# Patient Record
Sex: MALE | HISPANIC OR LATINO | Employment: FULL TIME | URBAN - METROPOLITAN AREA
[De-identification: names, ages, dates, MRNs, and addresses within clinical notes are randomized per-mention and may not be internally consistent; named-entity substitution may affect disease eponyms.]

---

## 2023-06-15 ENCOUNTER — OFFICE VISIT (OUTPATIENT)
Dept: FAMILY MEDICINE | Facility: CLINIC | Age: 18
End: 2023-06-15
Payer: COMMERCIAL

## 2023-06-15 VITALS
BODY MASS INDEX: 21.56 KG/M2 | WEIGHT: 145.6 LBS | HEIGHT: 69 IN | HEART RATE: 64 BPM | DIASTOLIC BLOOD PRESSURE: 68 MMHG | TEMPERATURE: 98.8 F | OXYGEN SATURATION: 98 % | SYSTOLIC BLOOD PRESSURE: 112 MMHG | RESPIRATION RATE: 16 BRPM

## 2023-06-15 DIAGNOSIS — J06.9 VIRAL URI: Primary | ICD-10-CM

## 2023-06-15 DIAGNOSIS — M54.6 BILATERAL THORACIC BACK PAIN, UNSPECIFIED CHRONICITY: ICD-10-CM

## 2023-06-15 PROCEDURE — 99204 OFFICE O/P NEW MOD 45 MIN: CPT | Performed by: PHYSICIAN ASSISTANT

## 2023-06-15 RX ORDER — BENZONATATE 100 MG/1
100 CAPSULE ORAL 3 TIMES DAILY PRN
Qty: 30 CAPSULE | Refills: 0 | Status: SHIPPED | OUTPATIENT
Start: 2023-06-15

## 2023-06-15 RX ORDER — CYCLOBENZAPRINE HCL 10 MG
10 TABLET ORAL
Qty: 20 TABLET | Refills: 0 | Status: SHIPPED | OUTPATIENT
Start: 2023-06-15

## 2023-06-15 RX ORDER — IPRATROPIUM BROMIDE 42 UG/1
2 SPRAY, METERED NASAL 4 TIMES DAILY
Qty: 15 ML | Refills: 0 | Status: SHIPPED | OUTPATIENT
Start: 2023-06-15

## 2023-06-15 ASSESSMENT — PAIN SCALES - GENERAL: PAINLEVEL: EXTREME PAIN (8)

## 2023-06-15 ASSESSMENT — ENCOUNTER SYMPTOMS: HEADACHES: 1

## 2023-06-15 NOTE — PATIENT INSTRUCTIONS
Back pain- muscular   You were prescribed a muscle relaxer. This can make you dizzy and/or drowsy.   Do NOT drink alcohol while taking.   Try for the first time at home (ie before bed) so you know how it affects you.   Do not drive while taking if you feel dizzy or drowsy.     Referral to physical therapy   ----    Viral respiratory infections are caused by viruses.   They do not improve with antibiotic treatment.   Symptoms tend to be most significant in the first 4-5 days, but may continue for 7-10 days.    Should be re-evaluated for new fevers, shortness of breath or difficulty breathing, painful breathing, chest pain, inability to swallow, other new or worsening symptoms.     Symptomatic treatments include:  Rest!   Stay well hydrated (water, broth soups, 7-up, gatorade, tea, etc). You should be able to empty your bladder every 4-6 hours.   Using a humidifer in the bedroom  Nasal saline sprays (ie little noses brand- OTC at pharmacy) for nasal congestion.  Vitamin C  Stay home, limit contact with others while you are ill, do not share food or beverages, and always use good handwashing.     For symptoms can try the following:   For body aches, headache, pain:   Tylenol (acetaminophen) up to 1000mg 3x/day.   Ibuprofen 600-800mg 3x/day (max dosing for adults is 12 over the counter tablets per 24 hrs).     For nasal congestion:   Prescription nasal spray     For cough:   Mucinex tablets (guaifenesin)- loosens chest mucus : 600mg extended release twice daily  Honey may soothe your sore throat and help manage your cough- may take straight or in warm water with lemon juice.    Return to Clinic  if symptoms not gradually improving over the next week or if worsening.           The symptoms you describe suggest a viral cause, which is much more common than a bacterial cause. Antibiotics will treat bacterial infections, but have no effect on viral infections. If possible, especially if improving, start with symptom care  for the first 7-10 days, then consider seeking further treatment or taking an antibiotic. Bacterial infections generally are more severe, including symptoms such as pus, fever over 101degrees F, or rapidly worsening.

## 2023-06-15 NOTE — PROGRESS NOTES
Assessment & Plan     Viral URI  Viral URI  Neg covid at [facility] yesterday- can see th lab result in care everywhere.   He is requesting other meds for symptomatic relief  rx per below for tessalon and atrovent.   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   Advise job modifications, proper body mechanics/ergonomics  Ice, heat  Ref to PT for stretching/strengthening  Muscle relaxer for occasional use at bedtime- precautions discussed and in avs  - cyclobenzaprine (FLEXERIL) 10 MG tablet; Take 1 tablet (10 mg) by mouth nightly as needed for muscle spasms  - Physical Therapy Referral; Future     Follow Up: The patient was instructed to contact clinic for worsening symptoms, non-improvement in time frame as expected/discussed, and for questions regarding medications or treatment plan. For virtual visits, the patient was advised to be seen for in person evaluation if symptoms or condition are worsening or non-improvement as expected.       Milagro Medrano PA-C  [facility] JULIANNA Childress is a 18 year old, presenting for the following health issues:  Headache and Nasal Congestion        6/15/2023     9:31 AM   Additional Questions   Roomed by YK   Accompanied by self     Patient had covid test done at [facility] yesterday at 4pm, Covid test was negative. Patient is requesting letter for work and travel. Patient states he is traveling tonight to [place] for sister's wedding.     Headache     History of Present Illness       Headaches:   Since the patient's last clinic visit, headaches are: no change  The patient is getting headaches:  Every week  He is not able  "to do normal daily activities when he has a migraine.  The patient is taking the following rescue/relief medications:  Ibuprofen (Advil, Motrin)   Patient states \"The relief is inconsistent\" from the rescue/relief medications.   The patient is taking the following medications to prevent migraines:  No medications to prevent migraines  In the past 4 weeks, the patient has gone to an Urgent Care or Emergency Room 0 times times due to headaches.    Reason for visit:  Covid Test  Symptom onset:  3-7 days ago  Symptom intensity:  Moderate  Symptom progression:  Staying the same  Had these symptoms before:  Yes  Has tried/received treatment for these symptoms:  Yes  Previous treatment was successful:  No    He eats 0-1 servings of fruits and vegetables daily.He consumes 1 sweetened beverage(s) daily.He exercises with enough effort to increase his heart rate 30 to 60 minutes per day.  He exercises with enough effort to increase his heart rate 5 days per week.   He is taking medications regularly.     Viral URI   Pt was seen yesterday at [facility] ER fir URI sx for 3 days. Sx started Monday (3-4d ago)- wke up not feeling well. He has had sx of body aches (gone today), headache (still today), runny nose persisting, and cough (minimally productive). He went to ER due to loss of taste and wanted covid test- covid testing was negative. Mild GI upset but no vomiting or diarrhea.   No fever, fatigue, chest pain, shortness of breath or sore throat.   Not COVID vaccinated. Had covid in 2020.   Works as a matt   Sister sick - cold sx like his- her sx started before his.   He was given letter for work yesterday   Taking - ibuprofen 600mg - every 6 hrs .   Most bothered by cough and rhinorrhea. Has flight tonight and hoping for other med options for sx.       Pain History:  When did you first notice your pain? 7 months   Have you seen anyone else for your pain? No  How has your pain affected your ability to work? Pain " "does not limit ability to work   What type of work do you or did you do? matt  Where in your body do you have pain? Back Pain- Pain is thoracic between shoulder blades.   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 Works M-F. Tends to have more pain end of the week and more sx at end of day. His pain improves over the weekend with resting avoiding the physical tasks.   No low back pain.  Description:   Location of pain: middle of back right  Character of pain: Aching, intermittent  Pain radiation: none  New numbness or weakness in legs, not attributed to pain: no   Intensity: Currently 8/10  Progression of Symptoms: waxing and waning  History:   Specific cause: none  Pain interferes with job: YES  History of back problems: no prior back problems  Any previous MRI or X-rays: None  Sees a specialist for back pain: No  Alleviating factors:   Improved by: Icy hot patch and NSAIDs helps a little.  Precipitating factors:  Worsened by: Lifting and Bending  Therapies tried and outcome: Icy Hot patch and NSAIDs    Accompanying Signs & Symptoms:  Risk of Fracture: None  Risk of Cauda Equina: None  Risk of Infection: None  Risk of Cancer: None  Risk of Ankylosing Spondylitis: Onset at age <35, male, AND morning back stiffness No      Review of Systems   Neurological: Positive for headaches.            Objective    /68 (BP Location: Left arm, Patient Position: Sitting, Cuff Size: Adult Regular)   Pulse 64   Temp 98.8  F (37.1  C) (Temporal)   Resp 16   Ht 1.765 m (5' 9.49\")   Wt 66 kg (145 lb 9.6 oz)   SpO2 98%   BMI 21.20 kg/m    Body mass index is 21.2 kg/m .  Physical Exam   GENERAL: healthy, alert and no distress  EYES: Eyes grossly normal to inspection, PERRL and conjunctivae and sclerae normal  HENT: Normal cephalic/atraumatic. Right ear: canal clear and TM's normal, no effusion.  Left ear: canal clear and TM's normal, no effusion. Nose mucosa: " erythematous, clear rhinorrhea, congestion. Lips normal, no lesions. Buccal muscosa moist. Soft palate no lesions or ulcers. Tonsils 0-1+ normal, no erythema, no exudates. Uvula midline. Posterior oropharynx no erythema.   NECK: no adenopathy, no asymmetry, masses, or scars and thyroid normal to palpation  RESP: lungs clear to auscultation - no rales, rhonchi or wheezes  CV: regular rate and rhythm, normal S1 S2, no S3 or S4, no murmur, click or rub, no peripheral edema and peripheral pulses strong  ABDOMEN: soft, nontender, no hepatosplenomegaly, no masses and bowel sounds normal  MS: no midline cspine tendernss. No midline Tspine tenderness. +tender out of proportion to light palpation of muscles between scapula and Tspine. Normal ROM cspine, tspine. Lspine no tenderness midline or paraspinous  SKIN: no suspicious lesions or rashes  NEURO: Normal strength and tone, mentation intact and speech normal